# Patient Record
Sex: FEMALE | Race: WHITE | NOT HISPANIC OR LATINO | URBAN - NONMETROPOLITAN AREA
[De-identification: names, ages, dates, MRNs, and addresses within clinical notes are randomized per-mention and may not be internally consistent; named-entity substitution may affect disease eponyms.]

---

## 2017-05-01 NOTE — PATIENT DISCUSSION
Glasses Option Counseling: I have discussed the option of glasses versus cataract surgery versus following. It was explained that when vision no longer meets the patient&rsquo;s visual needs and a new prescription for glasses is not likely to satisfy the patient, the option of cataract surgery is a reasonable next step. It has been determined from manifest refraction and vision testing that a new prescription for glasses may help improve the visual symptoms somewhat but is not likely to improve all the patient&rsquo;s visual symptoms. The patient was offered a new prescription for glasses. The patient has declined the option of glasses. It was explained that there is no guarantee that removing the cataract will improve their visual symptoms, however; it is believed that the cataract is contributing to the patient's visual impairment and surgery may significantly improve both the visual and functional status of the patient. The risks, benefits and alternatives of surgery were discussed with the patient.  After this discussion, the patient desires to proceed with cataract surgery with implantation of an intraocular lens to improve vision to drive safely with less glare, read small print and watch TV

## 2017-05-02 NOTE — PATIENT DISCUSSION
REFRACTIVE ERROR, OU &ndash; DISCUSSED OPTION OF CORRECTING AT THE TIME OF CATARACT SURGERY.  PT DESIRES TO BE LESS DEPENDENT ON GLASSES FOR BOTH READING AND DISTANCE IF POSS

## 2017-06-23 NOTE — PATIENT DISCUSSION
Gradually improving. Continue Durezol qh for 24 hours then continue every 2 hrs until next appointment.

## 2021-07-20 ENCOUNTER — IMPORTED ENCOUNTER (OUTPATIENT)
Dept: URBAN - NONMETROPOLITAN AREA CLINIC 1 | Facility: CLINIC | Age: 56
End: 2021-07-20

## 2021-07-20 PROBLEM — H16.223: Noted: 2021-07-20

## 2021-07-20 PROBLEM — H52.223: Noted: 2021-07-20

## 2021-07-20 PROBLEM — H18.593: Noted: 2021-07-20

## 2021-07-20 PROBLEM — H52.4: Noted: 2021-07-20

## 2021-07-20 PROBLEM — H25.813: Noted: 2021-07-20

## 2021-07-20 PROCEDURE — 92015 DETERMINE REFRACTIVE STATE: CPT

## 2021-07-20 PROCEDURE — 92004 COMPRE OPH EXAM NEW PT 1/>: CPT

## 2021-07-20 NOTE — PATIENT DISCUSSION
Presbyopia-Discussed diagnosis with patient. Updated spec Rx given. Recommend lens that will provide comfort as well as protect safety and health of eyes. ASIA-Explained ASIA and associated symptoms.-Restart RESTASIS BID OU-Can continue AT's PRN; consider plugs in future if necessary. Recheck in 4 monthsCataract OU-Not yet surgical. -Reviewed symptoms of advancing cataract growth such as glare and halos and decreased vision.-Continue to monitor for now. Pt will notify us if any new symptoms develop. 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 . She requested RX <br />for bifocal.. AC <br />

## 2022-04-10 ASSESSMENT — TONOMETRY
OS_IOP_MMHG: 12
OD_IOP_MMHG: 12

## 2022-04-10 ASSESSMENT — VISUAL ACUITY
OS_SC: 20/20
OS_CC: 20/20
OD_SC: 20/20
OD_CC: 20/20

## 2022-07-20 ENCOUNTER — ESTABLISHED PATIENT (OUTPATIENT)
Dept: RURAL CLINIC 2 | Facility: CLINIC | Age: 57
End: 2022-07-20

## 2022-07-20 DIAGNOSIS — H02.055: ICD-10-CM

## 2022-07-20 DIAGNOSIS — H25.813: ICD-10-CM

## 2022-07-20 DIAGNOSIS — H16.223: ICD-10-CM

## 2022-07-20 PROCEDURE — 67820 REVISE EYELASHES: CPT

## 2022-07-20 PROCEDURE — 99214 OFFICE O/P EST MOD 30 MIN: CPT

## 2022-07-20 ASSESSMENT — TONOMETRY
OS_IOP_MMHG: 14
OD_IOP_MMHG: 14

## 2022-07-20 ASSESSMENT — VISUAL ACUITY
OS_SC: 20/20
OD_SC: 20/20

## 2022-07-20 NOTE — PROCEDURE NOTE: CLINICAL
PROCEDURE NOTE: Epilation Left Lower Lid. Diagnosis: Trichiasis without Entropion. Anesthesia: Topical. Prep: Antibiotic Drops. Prior to treatment, the risks/benefits/alternatives were discussed. The patient wished to proceed with procedure. A time out to confirm the patient, site, and procedure has been achieved. The site has been marked. Misdirected lashes were removed with forceps under view of the slit lamp. Patient tolerated procedure well. There were no complications. Post-op instructions given. Ainsley Canseco